# Patient Record
Sex: FEMALE | URBAN - METROPOLITAN AREA
[De-identification: names, ages, dates, MRNs, and addresses within clinical notes are randomized per-mention and may not be internally consistent; named-entity substitution may affect disease eponyms.]

---

## 2020-12-23 ENCOUNTER — PATIENT OUTREACH (OUTPATIENT)
Dept: HEALTH INFORMATION MANAGEMENT | Facility: OTHER | Age: 70
End: 2020-12-23

## 2020-12-23 NOTE — PROGRESS NOTES
Outcome: Left Message- Schedule appt with [facility]    Please transfer to [facility]  at [phone] when patient returns call.  [facility] Verified: yes  Attempt # 1

## 2021-03-03 DIAGNOSIS — Z23 NEED FOR VACCINATION: ICD-10-CM

## 2021-05-12 ENCOUNTER — PATIENT OUTREACH (OUTPATIENT)
Dept: HEALTH INFORMATION MANAGEMENT | Facility: OTHER | Age: 71
End: 2021-05-12

## 2021-05-12 NOTE — PROGRESS NOTES
Outcome: Left Message    Please transfer to Patient Outreach Team at [phone] when patient returns call.    [employer] Checked & Epic Updated:  no    [facility] Verified: no    Attempt # 1

## 2021-05-26 ENCOUNTER — TELEPHONE (OUTPATIENT)
Dept: SCHEDULING | Facility: IMAGING CENTER | Age: 71
End: 2021-05-26

## 2021-09-22 NOTE — NON-PROVIDER
Outcome: Left Message- Schedule appt with [facility]    Please transfer to [facility]  at [phone] when patient returns call.  [facility] Verified: yes  Attempt # 2

## 2021-12-21 NOTE — PROGRESS NOTES
Outcome: Called pt to schedule [facility]KI. Verified HIPAA. Pt declined due to COVID-19 concerns. She also declined scheduling an appointment with PCP for the same reasons.     Attempt # 2

## 2022-07-07 ENCOUNTER — TELEPHONE (OUTPATIENT)
Dept: HEALTH INFORMATION MANAGEMENT | Facility: OTHER | Age: 72
End: 2022-07-07

## 2022-11-07 ENCOUNTER — DOCUMENTATION (OUTPATIENT)
Dept: HEALTH INFORMATION MANAGEMENT | Facility: OTHER | Age: 72
End: 2022-11-07

## 2023-01-20 ENCOUNTER — TELEPHONE (OUTPATIENT)
Dept: HEALTH INFORMATION MANAGEMENT | Facility: OTHER | Age: 73
End: 2023-01-20

## 2024-03-12 ENCOUNTER — TELEPHONE (OUTPATIENT)
Dept: HEALTH INFORMATION MANAGEMENT | Facility: OTHER | Age: 74
End: 2024-03-12